# Patient Record
Sex: MALE
[De-identification: names, ages, dates, MRNs, and addresses within clinical notes are randomized per-mention and may not be internally consistent; named-entity substitution may affect disease eponyms.]

---

## 2022-09-19 ENCOUNTER — HOSPITAL ENCOUNTER (EMERGENCY)
Dept: HOSPITAL 5 - ED | Age: 37
Discharge: HOME | End: 2022-09-19
Payer: SELF-PAY

## 2022-09-19 VITALS — SYSTOLIC BLOOD PRESSURE: 136 MMHG | DIASTOLIC BLOOD PRESSURE: 56 MMHG

## 2022-09-19 DIAGNOSIS — L03.115: Primary | ICD-10-CM

## 2022-09-19 DIAGNOSIS — Z79.899: ICD-10-CM

## 2022-09-19 PROCEDURE — 90715 TDAP VACCINE 7 YRS/> IM: CPT

## 2022-09-19 PROCEDURE — 99282 EMERGENCY DEPT VISIT SF MDM: CPT

## 2022-09-19 PROCEDURE — 90471 IMMUNIZATION ADMIN: CPT

## 2022-09-19 NOTE — EMERGENCY DEPARTMENT REPORT
Stated Complaint: LEG PAIN





- HPI


History of Present Illness: 





pt reports sore to right lower leg with swelling and drainage x 2 weeks. reports

scratching at bump  





- ROS


Review of Systems: 





right lower leg infection 


MSE screening note: 


Focused history and physical exam performed.


Due to findings the following was ordered:











ED Disposition for MSE


Condition: Stable

## 2022-09-19 NOTE — EMERGENCY DEPARTMENT REPORT
ED General Adult HPI





- General


Chief complaint: Wound/Laceration


Stated complaint: LEG PAIN


Time Seen by Provider: 09/19/22 11:19


Source: patient


Mode of arrival: Ambulatory


Limitations: No Limitations





- History of Present Illness


Initial comments: 





36 With no medical Hx patient report infection to right lower leg x 2 week due 

to scratching bump 2 weeks ago. No other symptoms reported. No fever reported.  

Unknown tetanus 





- Related Data


                                  Previous Rx's











 Medication  Instructions  Recorded  Last Taken  Type


 


Sulfamethoxazole/Trimethoprim 1 each PO BID 10 Days #20 tab 09/19/22 Unknown Rx





[Bactrim DS TAB]    











                                    Allergies











Allergy/AdvReac Type Severity Reaction Status Date / Time


 


No Known Allergies Allergy   Unverified 09/19/22 11:21














ED Review of Systems


ROS: 


Stated complaint: LEG PAIN


Other details as noted in HPI





Comment: All other systems reviewed and negative


Skin: other (rash noted with drainage to right lower leg )





ED Past Medical Hx





- Past Medical History


Previous Medical History?: No





- Surgical History


Past Surgical History?: No





- Medications


Home Medications: 


                                Home Medications











 Medication  Instructions  Recorded  Confirmed  Last Taken  Type


 


Sulfamethoxazole/Trimethoprim 1 each PO BID 10 Days #20 tab 09/19/22  Unknown Rx





[Bactrim DS TAB]     














ED Physical Exam





- General


Limitations: No Limitations


General appearance: alert, in no apparent distress





- Head


Head exam: Present: atraumatic, normocephalic





- Eye


Eye exam: Present: normal appearance





- ENT


ENT exam: Present: mucous membranes moist





- Neck


Neck exam: Present: normal inspection





- Respiratory


Respiratory exam: Present: normal lung sounds bilaterally.  Absent: respiratory 

distress





- Cardiovascular


Cardiovascular Exam: Present: regular rate, normal rhythm.  Absent: systolic 

murmur, diastolic murmur, rubs, gallop





- GI/Abdominal


GI/Abdominal exam: Present: soft, normal bowel sounds





- Rectal


Rectal exam: Present: deferred





- Extremities Exam


Extremities exam: Present: normal inspection





- Back Exam


Back exam: Present: normal inspection





- Neurological Exam


Neurological exam: Present: alert, oriented X3





- Psychiatric


Psychiatric exam: Present: normal affect, normal mood





- Skin


Skin exam: Present: warm, dry, intact, normal color, rash, other (right lower 

leg with rash )





ED Course


                                   Vital Signs











  09/19/22





  11:17


 


Temperature 98.5 F


 


Pulse Rate 54 L


 


Respiratory 18





Rate 


 


Blood Pressure 136/56





[Left] 


 


O2 Sat by Pulse 99





Oximetry 














ED Medical Decision Making





- Medical Decision Making





36 With no medical Hx patient report infection to right lower leg x 2 week due 

to scratching bump 2 weeks ago. No other symptoms reported. No fever reported.  

Unknown tetanus





Right leg with cellulitis present to the anterior tib-fib area.  Drainage 

present erythema and swelling noted.  No visible abscess.


Patient does not require I&D.  No concerns for admission.  No temp noted.


Patient to be discharged home with oral antibiotics.


Patient agrees with plan of care and verbalized understanding.


Patient informed if symptoms are to get worse to report back to the ER.


Patient stable for discharge.








                                   Vital Signs











  09/19/22





  11:17


 


Temperature 98.5 F


 


Pulse Rate 54 L


 


Respiratory 18





Rate 


 


Blood Pressure 136/56





[Left] 


 


O2 Sat by Pulse 99





Oximetry 











Critical care attestation.: 


If time is entered above; I have spent that time in minutes in the direct care 

of this critically ill patient, excluding procedure time.








ED Disposition


Clinical Impression: 


 Cellulitis of right leg without foot





Disposition: 01 HOME / SELF CARE / HOMELESS


Is pt being admited?: No


Condition: Stable


Instructions:  Cellulitis, Adult


Prescriptions: 


Sulfamethoxazole/Trimethoprim [Bactrim DS TAB] 1 each PO BID 10 Days #20 tab


Referrals: 


PRIMARY CARE,MD [Primary Care Provider] - 3-5 Days